# Patient Record
Sex: FEMALE | Race: WHITE
[De-identification: names, ages, dates, MRNs, and addresses within clinical notes are randomized per-mention and may not be internally consistent; named-entity substitution may affect disease eponyms.]

---

## 2019-06-19 RX ADMIN — CEFAZOLIN SODIUM 1 MLS/HR: 2 SOLUTION INTRAVENOUS at 05:30

## 2019-06-20 ENCOUNTER — HOSPITAL ENCOUNTER (OUTPATIENT)
Dept: HOSPITAL 10 - SDS | Age: 54
Discharge: HOME | End: 2019-06-20
Attending: ORTHOPAEDIC SURGERY
Payer: COMMERCIAL

## 2019-06-20 ENCOUNTER — HOSPITAL ENCOUNTER (OUTPATIENT)
Dept: HOSPITAL 91 - SDS | Age: 54
Discharge: HOME | End: 2019-06-20
Payer: COMMERCIAL

## 2019-06-20 VITALS — DIASTOLIC BLOOD PRESSURE: 70 MMHG | HEART RATE: 73 BPM | SYSTOLIC BLOOD PRESSURE: 121 MMHG | RESPIRATION RATE: 16 BRPM

## 2019-06-20 VITALS — RESPIRATION RATE: 15 BRPM | SYSTOLIC BLOOD PRESSURE: 117 MMHG | DIASTOLIC BLOOD PRESSURE: 68 MMHG | HEART RATE: 86 BPM

## 2019-06-20 VITALS — SYSTOLIC BLOOD PRESSURE: 110 MMHG | RESPIRATION RATE: 13 BRPM | HEART RATE: 75 BPM | DIASTOLIC BLOOD PRESSURE: 66 MMHG

## 2019-06-20 VITALS — RESPIRATION RATE: 14 BRPM | SYSTOLIC BLOOD PRESSURE: 109 MMHG | HEART RATE: 86 BPM | DIASTOLIC BLOOD PRESSURE: 72 MMHG

## 2019-06-20 VITALS — DIASTOLIC BLOOD PRESSURE: 72 MMHG | RESPIRATION RATE: 11 BRPM | SYSTOLIC BLOOD PRESSURE: 116 MMHG | HEART RATE: 78 BPM

## 2019-06-20 VITALS — DIASTOLIC BLOOD PRESSURE: 77 MMHG | SYSTOLIC BLOOD PRESSURE: 124 MMHG | HEART RATE: 84 BPM | RESPIRATION RATE: 15 BRPM

## 2019-06-20 VITALS — RESPIRATION RATE: 21 BRPM | DIASTOLIC BLOOD PRESSURE: 69 MMHG | SYSTOLIC BLOOD PRESSURE: 107 MMHG | HEART RATE: 74 BPM

## 2019-06-20 VITALS — DIASTOLIC BLOOD PRESSURE: 59 MMHG | SYSTOLIC BLOOD PRESSURE: 98 MMHG | HEART RATE: 80 BPM | RESPIRATION RATE: 12 BRPM

## 2019-06-20 VITALS — HEART RATE: 86 BPM | SYSTOLIC BLOOD PRESSURE: 118 MMHG | RESPIRATION RATE: 14 BRPM | DIASTOLIC BLOOD PRESSURE: 66 MMHG

## 2019-06-20 VITALS — HEART RATE: 90 BPM | SYSTOLIC BLOOD PRESSURE: 111 MMHG | RESPIRATION RATE: 14 BRPM | DIASTOLIC BLOOD PRESSURE: 70 MMHG

## 2019-06-20 VITALS — DIASTOLIC BLOOD PRESSURE: 95 MMHG | SYSTOLIC BLOOD PRESSURE: 166 MMHG | RESPIRATION RATE: 16 BRPM | HEART RATE: 64 BPM

## 2019-06-20 VITALS — SYSTOLIC BLOOD PRESSURE: 117 MMHG | HEART RATE: 76 BPM | DIASTOLIC BLOOD PRESSURE: 72 MMHG | RESPIRATION RATE: 16 BRPM

## 2019-06-20 VITALS — HEART RATE: 74 BPM | SYSTOLIC BLOOD PRESSURE: 118 MMHG | DIASTOLIC BLOOD PRESSURE: 69 MMHG | RESPIRATION RATE: 31 BRPM

## 2019-06-20 VITALS
BODY MASS INDEX: 22.32 KG/M2 | HEIGHT: 66 IN | WEIGHT: 138.89 LBS | WEIGHT: 138.89 LBS | BODY MASS INDEX: 22.32 KG/M2 | HEIGHT: 66 IN

## 2019-06-20 VITALS — DIASTOLIC BLOOD PRESSURE: 64 MMHG | HEART RATE: 88 BPM | RESPIRATION RATE: 15 BRPM | SYSTOLIC BLOOD PRESSURE: 109 MMHG

## 2019-06-20 VITALS — SYSTOLIC BLOOD PRESSURE: 123 MMHG | DIASTOLIC BLOOD PRESSURE: 75 MMHG | HEART RATE: 82 BPM | RESPIRATION RATE: 14 BRPM

## 2019-06-20 DIAGNOSIS — M25.811: Primary | ICD-10-CM

## 2019-06-20 DIAGNOSIS — M75.101: ICD-10-CM

## 2019-06-20 PROCEDURE — 29827 SHO ARTHRS SRG RT8TR CUF RPR: CPT

## 2019-06-20 PROCEDURE — 29826 SHO ARTHRS SRG DECOMPRESSION: CPT

## 2019-06-20 PROCEDURE — 29824 SHO ARTHRS SRG DSTL CLAVICLC: CPT

## 2019-06-20 PROCEDURE — 23405 INCISION OF TENDON & MUSCLE: CPT

## 2019-06-20 RX ADMIN — PYRIDOXINE HYDROCHLORIDE 1 MLS/HR: 100 INJECTION, SOLUTION INTRAMUSCULAR; INTRAVENOUS at 15:30

## 2019-06-20 RX ADMIN — TRANEXAMIC ACID 1: 10 INJECTION, SOLUTION INTRAVENOUS at 13:39

## 2019-06-20 RX ADMIN — GABAPENTIN 1 MG: 300 CAPSULE ORAL at 09:23

## 2019-06-20 RX ADMIN — EPINEPHRINE 1 MG: 1 INJECTION PARENTERAL at 13:35

## 2019-06-20 NOTE — OPR
Date/Time of Note


Date/Time of Note


DATE: 6/20/19 


TIME: 06:06





Operative Report


Procedure Date:  Jun 20, 2019


Preoperative Diagnosis


Right shoulder rotator cuff tear with impingement


Postoperative Diagnosis


1.  Right shoulder rotator cuff tear


2.  Right shoulder acromioclavicular joint arthritis


3.  Right shoulder impingement


4.  Right shoulder labral tear with biceps tendon tear


Operation/Procedure Performed


1.  Right shoulder arthroscopic rotator cuff repair


2.  Right shoulder arthroscopic distal clavicular excision


3.  Right shoulder arthroscopic acromioplasty


4.  Right shoulder arthroscopic extensive debridement of glenohumeral joint with


release of biceps tendon


Surgeon


see signature line


Assistant


Juan A Reyes PA-C


Anesthesia Type:  general


Estimated Blood Loss:  minimal


Transfusion


   none


Specimen


None


Grafts/Implants


See op note


Complications


none


Pt Condition Post Procedure:  stable


Disposition:  PACU


Procedure Description


ASSISTANT SURGEON: Juan A Reyes PA-C was asked to be present at my request


as a result of the complexity associated with this procedure including 


positioning of the extremities, manipulation of the arthroscope and assistance 


with suture management and implantation of suture anchors.  In my opinion, the 


assistance offered by a surgical scrub tech is insufficient and Mr. Reyes 


should be compensated for his time.





PROCEDURE IN DETAIL:    Following the administration of general anesthesia 


supplemented with a peripheral nerve block for postoperative pain control, the 


patient was examined under anesthesia.  Examination of the right shoulder 


revealed very significant stiffness.  Specifically, her forward flexion was 90 


degrees, abduction 80 degrees external rotation 45 degrees and internal rotation


10 degrees.  A passive manipulation was then undertaken very carefully.  The 


ultimate range of motion was approximately 140 degrees of forward flexion 110 de


grees of abduction 80 degrees and internal rotation of 20 degrees.  See letter 


several significant adhesions were noted to be released.





The patient was then placed in the left lateral decubitus position.  Sterile 


prep and drape was then undertaken of the right shoulder.  Anterior and 


posterior glenohumeral portals were established.  





Glenohumeral arthroscopy revealed that the humeral and glenoid articular 


cartilage revealed some diffuse grade 2 changes and in the central portion of 


the humerus there was some mild grade 2 changes also in a diffuse fashion.  


There was some mild softening in a generalized fashion..    The superior labrum 


was diffusely torn with extensive fraying that extended from 9:00 to the 3 


o'clock position.  The biceps had severe synovitis with subluxation and 


significant fraying.  In addition, the biceps anchor was detached completely.





The subscapularis was visualized and it had a solid attachment.  No 


abnormalities are noted anteriorly with the exception of the very severe 


synovitis which was actually in the notch itself.





Further evaluation of the supraspinatus revealed complete tear of the 


supraspinatus measuring 1 x 2 cm.  Some significant frayed tissue was also 


noted.  Infraspinatus was normal.





The superior labrum then debrided extensively from the 9:00 to the 3 o'clock 


position was undertaken down to stable tissue.  Severe synovitis was also 


debrided down to stable tissue.  The biceps tendon was also released and seen to


retract.  The severe synovitis in the anterior compartment was then debrided.





The subacromial space was then entered and very severe bursal reactive tissue 


was noted.  There was a thickened coracoacromial ligament, with a 5 to 7 mm 


acromial prominence noted, which extended medially as well into the 


acromioclavicular joint.  The anterior acromion was then cleared of soft tissue 


and the coracoacromial ligament released.  The acromion was then resected 


approximately 5 to 7 mm down to a flat surface.





The outer surface of the rotator cuff was then inspected.  The rotator cuff tear


was noted to be complete and a debridement of this area was undertaken down to 


stable tissue in order to prepare for the repair.





The subacromial space was then further evaluated medially.  The 


acromioclavicular joint was then skeletonized and a significant inferior 


osteophyte was noted.  The AC joint capsule was then opened and  the distal 


clavicle was skeletonized for a distance of 10 mm.  The arsh was then inserted 


and 10 mm of the distal clavicle were then excised.





The rotator cuff repair was then undertaken.  Specifically, a 5 mm, triple 


loaded titanium anchor was then inserted into the tuberosity.  The sutures were 


then passed in a mattress fashion and tied using sliding, locking knots.  A 


solid repair of the rotator cuff was completed.





The joint was then thoroughly irrigated bony debris removed, the deep tissues 


were approximated using 4-0 Monocryl, followed by a sterile dressing.  A sling 


was then applied.





The patient was awakened and transported to the recovery room in stable 


condition.











PHILL SANDS MD            Jun 20, 2019 06:08

## 2019-06-20 NOTE — PDOCDIS
Discharge Instructions


DIAGNOSIS


Discharge Diagnosis


Rotator cuff tear





CONDITION


                 [id number]
Patient Condition:  [id number]
Good








HOME CARE INSTRUCTIONS:


                [id number]
Diet Instructions:  [id number]
Regular








ACTIVITY:


     [id number]
Activity Restrictions:  [id number]
Slowly Increase Activity


                                        Keep Limb Elevated


     [id number]
Bathing Restrictions:   [id number]
Shower








FOLLOW UP/APPOINTMENTS


Follow-up Plan


2 weeks in the office





SCHOOL/WORK RELEASE


May return to School/Work with:  With Restrictions


School/Work Release Comment:  5 pound tabletop usage for 6 weeks











PHILL SANDS MD            Jun 20, 2019 14:14

## 2019-06-20 NOTE — PAC
Date/Time of Note


Date/Time of Note


DATE: 6/20/19 


TIME: 14:58





Post-Anesthesia Notes


Post-Anesthesia Note


Last documented vital signs





Vital Signs


  Date      Temp  Pulse  Resp  B/P (MAP)   Pulse Ox  O2          O2 Flow    FiO2


Time                                                 Delivery    Rate


   6/20/19  98.0     64    16      166/95        99  Room Air


     15:00                          (118)





Activity:  WNL


Respiratory function:  WNL


Cardiovascular function:  WNL


Mental status:  Baseline


Pain reasonably controlled:  Yes


Hydration appropriate:  Yes


Nausea/Vomiting absent:  Yes











NICK LEONG MD              Jun 20, 2019 14:59

## 2019-06-20 NOTE — PREAC
Date/Time of Note


Date/Time of Note


DATE: 6/20/19 


TIME: 13:34





Anesthesia Eval and Record


Evaluation


Time Pre-Procedure Interview


DATE: 6/20/19 


TIME: 12:10


Age


54


Sex


female


NPO:  8 hrs


Preoperative diagnosis


Right Rotator Cuff Tear


Planned procedure


Right Shoulder Arthroscopy and RCR with Acromioplasty





Past Medical History


Past Medical History:  None





Surgery & Anesthesia Issues


No known issue





Meds


Anticoagulation:  No


Beta Blocker within 24 hr:  No


Reason Beta Blocker not given:  Pt. not on B-Blocker


Reported Medications


Levothyroxine Sodium* (Levoxyl*) 25 Mcg Tablet, 25 MCG PO BEFORE BREAKFAST, #30 


TAB


   6/20/19


Meds reviewed:  Yes





Allergies


Coded Allergies:  


     No Known Allergy (Unverified , 6/20/19)


Allergies Reviewed:  Yes





Labs/Studies


Labs Reviewed:  Reviewed by anesthesiologist


Pregnancy test:  Negative


Studies:  ECG (n/a), CXR (n/a)





Pre-procedure Exam


Last vitals





Vital Signs


  Date      Temp  Pulse  Resp  B/P (MAP)   Pulse Ox  O2          O2 Flow    FiO2


Time                                                 Delivery    Rate


   6/20/19  97.9     64    16      166/95        99  Room Air


     09:52                          (118)





Airway:  Adequate mouth opening, Adequate thyromental dist


Mallampati:  Mallampati II


Teeth:  Normal


Lung:  Normal


Heart:  Normal





ASA Physical Status


ASA physical status:  2


Emergency:  None





Planned Anesthetic


General/MAC:  ETT


Nerve block:  Brachial plexus (right)





Planned Pain Management


Single shot nerve block, Parenteral pain med





Pre-operative Attestations


Prior to commencing anesthesia and surgery, the patient was re-evaluated, there 


was verification of:


*The patient's identity


*The results of appropriate recent lab work and preoperative vital signs


*The above evaluation not changing prior to induction


*Anesthetic plan, risk benefits, alternative and complications discussed with 


patient/family; questions answered; patient/family understands, accepts and 


wishes to proceed.











NICK LEONG MD              Jun 20, 2019 13:36

## 2019-06-20 NOTE — HPN
Date/Time of Note


Date/Time of Note


DATE: 6/20/19 


TIME: 06:06





Interval H&P Admission Note


Pt. seen H&P reviewed:  No system changes











PHILL SANDS MD            Jun 20, 2019 06:06